# Patient Record
(demographics unavailable — no encounter records)

---

## 2024-11-19 NOTE — PHYSICAL EXAM
[2+] : 2+ [Normal Gait and Station] : normal gait and station [Normal muscle strength, symmetry and tone of facial, head and neck musculature] : normal muscle strength, symmetry and tone of facial, head and neck musculature [Normal] : no cervical lymphadenopathy [Exposed Vessel] : left anterior vessel not exposed [Increased Work of Breathing] : no increased work of breathing with use of accessory muscles and retractions [de-identified] : ?retracted

## 2024-11-19 NOTE — REASON FOR VISIT
[Subsequent Evaluation] : a subsequent evaluation for [Nasal Obstruction] : nasal obstruction [Nasal Discharge] : nasal discharge [Father] : father [Other: _____] : [unfilled]

## 2024-11-19 NOTE — ASSESSMENT
[FreeTextEntry1] : 4 year female with nasal congestion and AH.    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  Discussed risks, alternatives, and benefits of adenoidectomy and bilateral myringotomy with tube placement including but not limited to bleeding, infection, scarring, voice changes, pain, dehydration, persistence of sleep apnea, regrowth of adenoids,  TM perforation, early extrusion, late extrusion, or need for further operation.  Briefly discussed risk of anesthesia but they will discuss more in depth with the anesthesiologist the day of the procedure.  Parent agreed to proceed to surgery and this will be scheduled accordingly.   Discussed at length that ear fluid itself is a result of a mechanical problem due to swelling and inflammation after URIs and that if not infected fluid that we often don't treat with antibiotics.  The underlying issues is eustachian tube dysfunction which can be transient in which we just wait for viral illnesses to run their course.  If the ETD is chronic that is when we discuss possible ear tubes.  Unfortunately there is no good evidence about medications to help improve transient ETD but some have tried nasal sprays including steroids and allergy meds.  Discussed that when they have ear fluid during a URI we recommend waiting 2-3 days and treat supportively and with tylenol or motrin. If the infections persists past that time, can consider oral abx.  Ear tubes in this setting simply bypass the eustachian tube allowing it time to improve function on its own.  The hope is that fewer ear infections and not needing oral abx for ear infections with ear tubes in place (just ear drops).   Discussed with the parent regarding sleep observation by going into their kids room a few times a week and watch them sleep for 5-10 min at varying times of the night to monitor snoring, apneas or gasping, signs of struggling to breath, restlessness, or other signs of SDB.  Sometimes we consider ordering a sleep study if highly concerned. Can discuss findings at next appointment.   Observe tonsils for now given no obvious SDB/SATHISH symptoms.  Consider tonsils pending PSG.   Plan: (P) Bilateral PETs (CPT 82089-33) Adenoidectomy (CPT 60437) Outpatient/[facility]/[place] PCP clearance 15 minutes RTC 3 months post op

## 2024-11-19 NOTE — DATA REVIEWED
[FreeTextEntry1] : Audiogram Audiogram was ordered for reported hx of hearing difficulties. This was personally reviewed and interpreted by me. Tymps: B/C Hearing: -4kHz

## 2024-11-19 NOTE — HISTORY OF PRESENT ILLNESS
[Snoring] : snoring [No Personal or Family History of Easy Bruising, Bleeding, or Issues with General Anesthesia] : No Personal or Family History of easy bruising, bleeding, or issues with general anesthesia [de-identified] : 11-19-24 still lots of nasal congestion.  snoring at night but no apneas or pauses. no attention or focus issues. no restless sleep. no new AOM. c/o ear pain and hearing issues. mild dry cough at night.  no lung infections.  tried nasal steroids without significant improvement.  does have PSG scheduled for December.   8-19-24 4 year F with chronic nasal congestion and speech articulation concerns SLP thought may be worth getting ENT evaluation Saw [facility] x2 - advised adenoid hypertrophy and recommended nasal steroid  +Nasal congestion Using nasal steroid PRN without change +Snoring even in character Denies apnea, daytime tiredness and behavior issues  No recent ear infections No otorrhea Passed [facility] Gets speech with good progress 1 strep infection this year No bleeding or anesthesia issues

## 2025-01-28 NOTE — REVIEW OF SYSTEMS
[Negative] : Heme/Lymph [Earache] : earache [Cough] : cough [Fever] : no fever [Shortness Of Breath] : no shortness of breath [Wheezing] : no wheezing [SOB on Exertion] : no shortness of breath during exertion [FreeTextEntry4] : nasal congestion  [FreeTextEntry6] : croup sounding

## 2025-01-28 NOTE — PHYSICAL EXAM
[General Appearance - Well Developed] : interactive [General Appearance - Well-Appearing] : well appearing [General Appearance - In No Acute Distress] : in no acute distress [Sclera] : the conjunctiva were normal [Normal Appearance] : was normal in appearance [Neck Supple] : was supple [Respiration, Rhythm And Depth] : normal respiratory rhythm and effort [Auscultation Breath Sounds / Voice Sounds] : clear bilateral breath sounds [Heart Rate And Rhythm] : heart rate and rhythm were normal [Heart Sounds] : normal S1 and S2 [Murmurs] : no murmurs [Bowel Sounds] : normal bowel sounds [Abdomen Soft] : soft [Abdomen Tenderness] : non-tender [Abdominal Distention] : nondistended [] : no hepato-splenomegaly [Musculoskeletal Exam: Normal Movement Of All Extremities] : normal movements of all extremities [No Visual Abnormalities] : no visible abnormailities [Motor Tone] : normal muscle strength and tone [Generalized Lymph Node Enlargement] : no lymphadenopathy [Normal] : normal texture and mobility [Tympanic Membrane Erythematous Left] : was red [Initial Inspection: Infant Active And Alert] : active and alert [External Female Genitalia] : normal external genitalia [Enlarged Diffusely] : was not enlarged [FreeTextEntry1] : speech delay [Abnormal Color] : normal color and pigmentation [Skin Lesions 1] : no skin lesions were observed [Skin Turgor Decreased] : normal skin turgor

## 2025-01-28 NOTE — HISTORY OF PRESENT ILLNESS
[Preoperative Visit] : for a medical evaluation prior to surgery [Good] : Good [Cough] : cough [Sleep Apnea] : sleep apnea [Prior Anesthesia] : No prior anesthesia [Prev Anesthesia Reaction] : no previous anesthesia reaction [Diabetes] : no diabetes [Pulmonary Disease] : no pulmonary disease [Renal Disease] : no renal disease [GI Disease] : no gastrointestinal disease [Transfusion Reaction] : no transfusion reaction [Impaired Immunity] : no impaired immunity [Frequent use of NSAIDs] : no use of NSAIDs [Anesthesia Reaction] : no anesthesia reaction [Clotting Disorder] : no clotting disorder [Bleeding Disorder] : no bleeding disorder [Sudden Death] : no sudden death [FreeTextEntry2] : 2/6/2025 [de-identified] : cold x2 weeks with a congested cough, no fevers  [FreeTextEntry1] : Ting presents with dad for pre-op clearance for Adenoidectomy on 2/6/2025 Mom and dad concerned about cough that she has had for 2 weeks. Mom giving her saline nebs at home. Pt has had cough and congestion for 2 weeks without fevers. Pt also started complaining of left ear pain.  Pt taking nasal spray & OTC cough medicine.

## 2025-02-04 NOTE — PHYSICAL EXAM
[General Appearance - Well Developed] : interactive [General Appearance - Well-Appearing] : well appearing [General Appearance - In No Acute Distress] : in no acute distress [Sclera] : the conjunctiva were normal [Outer Ear] : the ears and nose were normal in appearance [Examination Of The Oral Cavity] : mucous membranes were moist and pink [Normal Appearance] : was normal in appearance [Neck Supple] : was supple [Respiration, Rhythm And Depth] : normal respiratory rhythm and effort [Auscultation Breath Sounds / Voice Sounds] : clear bilateral breath sounds [Heart Rate And Rhythm] : heart rate and rhythm were normal [Heart Sounds] : normal S1 and S2 [Murmurs] : no murmurs [Bowel Sounds] : normal bowel sounds [Abdomen Soft] : soft [Abdomen Tenderness] : non-tender [Abdominal Distention] : nondistended [] : no hepato-splenomegaly [Musculoskeletal Exam: Normal Movement Of All Extremities] : normal movements of all extremities [Motor Tone] : muscle strength and tone were normal [Generalized Lymph Node Enlargement] : no lymphadenopathy [Normal] : normal texture and mobility [Initial Inspection: Infant Active And Alert] : active and alert [Enlarged Diffusely] : was not enlarged [Exaggerated Use Of Accessory Muscles For Inspiration] : no accessory muscle use [FreeTextEntry1] : NO wheezing. Good symmetric air entry throughout. NO crackles.  [Abnormal Walk] : normal gait [Abnormal Color] : normal color and pigmentation [Skin Lesions 1] : no skin lesions were observed [Skin Turgor Decreased] : normal skin turgor

## 2025-02-04 NOTE — HISTORY OF PRESENT ILLNESS
[Preoperative Visit] : for a medical evaluation prior to surgery [Good] : Good [Runny Nose] : no runny nose [Fever] : no fever [Chills] : no chills [Earache] : no earache [Headache] : no headache [Sore Throat] : no sore throat [Vomiting] : no vomiting [Diarrhea] : no diarrhea [Easy Bruising] : no easy bruising [Rash] : no rash [Prior Anesthesia] : No prior anesthesia [Anesthesia Reaction] : no anesthesia reaction [Clotting Disorder] : no clotting disorder [Bleeding Disorder] : no bleeding disorder [Sudden Death] : no sudden death [FreeTextEntry1] : adenoidectomy [FreeTextEntry2] : 2/6/25 [de-identified] : Rupa [de-identified] : Recently dx with OM and croup -  croupy cough resolved - mild intermittent cough/throat clearing. Baseline congestion. Taking  last dose of Cefdinir today for LOM.  No c/o pain. Appetite/activity at baseline.

## 2025-02-04 NOTE — HISTORY OF PRESENT ILLNESS
[Preoperative Visit] : for a medical evaluation prior to surgery [Good] : Good [Runny Nose] : no runny nose [Fever] : no fever [Chills] : no chills [Earache] : no earache [Headache] : no headache [Sore Throat] : no sore throat [Vomiting] : no vomiting [Diarrhea] : no diarrhea [Easy Bruising] : no easy bruising [Rash] : no rash [Prior Anesthesia] : No prior anesthesia [Anesthesia Reaction] : no anesthesia reaction [Clotting Disorder] : no clotting disorder [Bleeding Disorder] : no bleeding disorder [Sudden Death] : no sudden death [FreeTextEntry1] : adenoidectomy [FreeTextEntry2] : 2/6/25 [de-identified] : Rupa [de-identified] : Recently dx with OM and croup -  croupy cough resolved - mild intermittent cough/throat clearing. Baseline congestion. Taking  last dose of Cefdinir today for LOM.  No c/o pain. Appetite/activity at baseline.

## 2025-02-04 NOTE — PHYSICAL EXAM
[General Appearance - Well Developed] : interactive [General Appearance - Well-Appearing] : well appearing [General Appearance - In No Acute Distress] : in no acute distress [Sclera] : the conjunctiva were normal [Outer Ear] : the ears and nose were normal in appearance [Examination Of The Oral Cavity] : mucous membranes were moist and pink [Normal Appearance] : was normal in appearance [Neck Supple] : was supple [Respiration, Rhythm And Depth] : normal respiratory rhythm and effort [Auscultation Breath Sounds / Voice Sounds] : clear bilateral breath sounds [Heart Rate And Rhythm] : heart rate and rhythm were normal [Heart Sounds] : normal S1 and S2 [Murmurs] : no murmurs [Abdomen Soft] : soft [Bowel Sounds] : normal bowel sounds [Abdomen Tenderness] : non-tender [Abdominal Distention] : nondistended [] : no hepato-splenomegaly [Musculoskeletal Exam: Normal Movement Of All Extremities] : normal movements of all extremities [Motor Tone] : muscle strength and tone were normal [Generalized Lymph Node Enlargement] : no lymphadenopathy [Normal] : normal texture and mobility [Initial Inspection: Infant Active And Alert] : active and alert [Enlarged Diffusely] : was not enlarged [Exaggerated Use Of Accessory Muscles For Inspiration] : no accessory muscle use [FreeTextEntry1] : NO wheezing. Good symmetric air entry throughout. NO crackles.  [Abnormal Walk] : normal gait [Abnormal Color] : normal color and pigmentation [Skin Lesions 1] : no skin lesions were observed [Skin Turgor Decreased] : normal skin turgor

## 2025-03-21 NOTE — HISTORY OF PRESENT ILLNESS
[Normal] : Normal [Yes] : Patient goes to dentist yearly [In Pre-K] : In Pre-K [No] : No cigarette smoke exposure [Water heater temperature set at <120 degrees F] : Water heater temperature set at <120 degrees F [Car seat in back seat] : Car seat in back seat [Carbon Monoxide Detectors] : Carbon monoxide detectors [Smoke Detectors] : Smoke detectors [Supervised outdoor play] : Supervised outdoor play [Fruit] : fruit [Vegetables] : vegetables [Meat] : meat [Grains] : grains [Brushing teeth] : Brushing teeth [Toothpaste] : Primary Fluoride Source: Toothpaste [FreeTextEntry7] : s/p adenoidectomy, has an appt scheduled for follow up on Apil 1st

## 2025-03-21 NOTE — DISCUSSION/SUMMARY
[Normal Growth] : growth [Normal Development] : development  [No Elimination Concerns] : elimination [Continue Regimen] : feeding [No Skin Concerns] : skin [Normal Sleep Pattern] : sleep [None] : no medical problems [Anticipatory Guidance Given] : Anticipatory guidance addressed as per the history of present illness section [No Vaccines] : no vaccines needed [No Medications] : ~He/She~ is not on any medications [School Readiness] : school readiness [Healthy Personal Habits] : healthy personal habits [TV/Media] : tv/media [Child and Family Involvement] : child and family involvement [Safety] : safety [] : The components of the vaccine(s) to be administered today are listed in the plan of care. The disease(s) for which the vaccine(s) are intended to prevent and the risks have been discussed with the caretaker.  The risks are also included in the appropriate vaccination information statements which have been provided to the patient's caregiver.  The caregiver has given consent to vaccinate. [FreeTextEntry1] : Continue balanced diet with all food groups. Brush teeth twice a day with toothbrush. Recommend visit to dentist. As per car seat 's guidelines, use forward-facing booster seat until child reaches highest weight/height for seat. Child needs to ride in a belt-positioning booster seat until  4 feet 9 inches has been reached and are between 8 and 12 years of age.  Put child to sleep in own bed. Help child to maintain consistent daily routines and sleep schedule. Pre-K discussed. Ensure home is safe. Teach child about personal safety. Use consistent, positive discipline. Read aloud to child. Limit screen time to no more than 2 hours per day. RTO in 1 month for DTaP-IPV

## 2025-03-21 NOTE — DEVELOPMENTAL MILESTONES
[Goes to the bathroom and has] : goes to bathroom and has bowel movement by self [None] : none [Uses words that are 100%] : does not use words that are 100% intelligible to strangers [Climbs stairs, alternating feet] : climbs stairs, alternating feet without support [Draws recognizable pictures] : draws recognizable pictures [FreeTextEntry1] : speech thru [facility]- 2x/week  [Yes] : Completed.

## 2025-03-21 NOTE — PHYSICAL EXAM

## 2025-04-21 NOTE — REASON FOR VISIT
[Subsequent Evaluation] : a subsequent evaluation for [Nasal Obstruction] : nasal obstruction [Nasal Discharge] : nasal discharge [Father] : father

## 2025-04-21 NOTE — PHYSICAL EXAM
[Placement/Patency] : tympanostomy tube in place and patent [2+] : 2+ [Normal Gait and Station] : normal gait and station [Normal muscle strength, symmetry and tone of facial, head and neck musculature] : normal muscle strength, symmetry and tone of facial, head and neck musculature [Normal] : no cervical lymphadenopathy [Exposed Vessel] : left anterior vessel not exposed [Increased Work of Breathing] : no increased work of breathing with use of accessory muscles and retractions

## 2025-04-21 NOTE — HISTORY OF PRESENT ILLNESS
[Snoring] : snoring [No Personal or Family History of Easy Bruising, Bleeding, or Issues with General Anesthesia] : No Personal or Family History of easy bruising, bleeding, or issues with general anesthesia [de-identified] : 4-21-25 s/p BMT. doing well. no RAOM. no drainage. Also s/p ads. still with some issues with speech artic. in speech therapy. some nasality to her speech. no snoring. no apneas or pauses.  Parent used as independent historian given patient age and maturity.  11-19-24 still lots of nasal congestion.  snoring at night but no apneas or pauses. no attention or focus issues. no restless sleep. no new AOM. c/o ear pain and hearing issues. mild dry cough at night.  no lung infections.  tried nasal steroids without significant improvement.  does have PSG scheduled for December.  Parent used as independent historian given patient age and maturity.  8-19-24 4 year F with chronic nasal congestion and speech articulation concerns SLP thought may be worth getting ENT evaluation Saw [facility] x2 - advised adenoid hypertrophy and recommended nasal steroid  +Nasal congestion Using nasal steroid PRN without change +Snoring even in character Denies apnea, daytime tiredness and behavior issues  No recent ear infections No otorrhea Passed [facility] Gets speech with good progress 1 strep infection this year No bleeding or anesthesia issues

## 2025-04-21 NOTE — ASSESSMENT
[FreeTextEntry1] : 4 year female s/p ear tubes.  TIPP and audio c/w borderline normal hearing.  Discussed will monitor tubes until they come out on their own usually about 8-18 months. Will monitor hearing.  Any ear infections no longer need oral abx and can be treated with ear drops alone.  Continue speech therapy if indicated.    Also s/p adenoidectomy.    Discussed that adenoids can grow back and that we will monitor for now.  Any signs of recurrent nasal congestion or snoring they should let us know.  Monitor tonsils for now. Discussed recurrent strep and SATHISH as the primary indications of tonsil removal. Discussed S/sx to monitor for and to let us know if anything changes.   some nasality to speech. recommend cont therapy for now. can rescope at next visit if not better.   RTC 6 months with audio

## 2025-04-21 NOTE — DATA REVIEWED
[FreeTextEntry1] : Audiogram Indications  An audiogram was ordered to evaluate for eustachian tube dysfunction and/or conductive or sensorineural hearing loss given current symptoms/findings. Findings: Tymps: Patent PETs Audio: SFs borderline -4kHz, SDT 15 The above results were independently interpreted by me and discussed with the family.